# Patient Record
Sex: FEMALE | Race: WHITE | NOT HISPANIC OR LATINO | Employment: UNEMPLOYED | URBAN - METROPOLITAN AREA
[De-identification: names, ages, dates, MRNs, and addresses within clinical notes are randomized per-mention and may not be internally consistent; named-entity substitution may affect disease eponyms.]

---

## 2022-04-10 ENCOUNTER — HISTORICAL (OUTPATIENT)
Dept: ADMINISTRATIVE | Facility: HOSPITAL | Age: 27
End: 2022-04-10

## 2022-04-26 VITALS
SYSTOLIC BLOOD PRESSURE: 101 MMHG | DIASTOLIC BLOOD PRESSURE: 65 MMHG | HEIGHT: 63 IN | WEIGHT: 107.06 LBS | BODY MASS INDEX: 18.97 KG/M2

## 2023-01-13 DIAGNOSIS — Z01.419 ENCOUNTER FOR ANNUAL ROUTINE GYNECOLOGICAL EXAMINATION: ICD-10-CM

## 2023-01-13 PROBLEM — Z30.9 ENCOUNTER FOR CONTRACEPTIVE MANAGEMENT, UNSPECIFIED: Status: ACTIVE | Noted: 2023-01-13

## 2023-02-01 ENCOUNTER — DOCUMENTATION ONLY (OUTPATIENT)
Dept: OBSTETRICS AND GYNECOLOGY | Facility: CLINIC | Age: 28
End: 2023-02-01
Payer: COMMERCIAL

## 2023-02-01 LAB — PAP SMEAR: NORMAL

## 2023-02-02 ENCOUNTER — OFFICE VISIT (OUTPATIENT)
Dept: OBSTETRICS AND GYNECOLOGY | Facility: CLINIC | Age: 28
End: 2023-02-02
Payer: COMMERCIAL

## 2023-02-02 VITALS
HEART RATE: 72 BPM | SYSTOLIC BLOOD PRESSURE: 80 MMHG | WEIGHT: 111.75 LBS | HEIGHT: 63 IN | BODY MASS INDEX: 19.8 KG/M2 | DIASTOLIC BLOOD PRESSURE: 60 MMHG

## 2023-02-02 DIAGNOSIS — N92.0 MENORRHAGIA WITH REGULAR CYCLE: Primary | ICD-10-CM

## 2023-02-02 DIAGNOSIS — D25.9 UTERINE LEIOMYOMA, UNSPECIFIED LOCATION: ICD-10-CM

## 2023-02-02 PROCEDURE — 99213 PR OFFICE/OUTPT VISIT, EST, LEVL III, 20-29 MIN: ICD-10-PCS | Mod: ,,, | Performed by: OBSTETRICS & GYNECOLOGY

## 2023-02-02 PROCEDURE — 99213 OFFICE O/P EST LOW 20 MIN: CPT | Mod: ,,, | Performed by: OBSTETRICS & GYNECOLOGY

## 2023-02-02 RX ORDER — TRANEXAMIC ACID 650 MG/1
1300 TABLET ORAL 3 TIMES DAILY
Qty: 30 TABLET | Refills: 5 | Status: SHIPPED | OUTPATIENT
Start: 2023-02-02 | End: 2023-02-07

## 2023-02-03 NOTE — PROGRESS NOTES
"     Patient ID: [id number]     Chief Complaint: Results (PRESENTS TO CLINIC FOR RESULTS OF LABS AND U/S DONE. CONT. TO C/O IRREG BLEEDING AND CRAMPING.)      HPI:     Jany Ibarra is a 27 y.o. female here today for Results (PRESENTS TO CLINIC FOR RESULTS OF LABS AND U/S DONE. CONT. TO C/O IRREG BLEEDING AND CRAMPING.)  PELVIC U/S: 7CM UTERUS, RETROFLEXED, SUBCM FIBROID, SUBSEROSAL, ES: 1.4 MM, ADNEXAL NL.   TSH/FREE T4: NL  H/H:   ONE SWAB AND PAP NEG  This finding discussed with patient at length. She is trying to conceive. Discussed non hormonal options to control bleeding. Discussed Lysted and how to use it.     Past Medical History:  has no past medical history on file.    Surgical History:  has a past surgical history that includes  section; Forehead reconstruction (N/A); SKIN GRAPH GROIN AREA; and HALO VEST.    Family History: family history includes Bone cancer in her maternal grandmother; Colon cancer in her maternal grandmother.    Social History:  reports that she has never smoked. She has never used smokeless tobacco. She reports current alcohol use of about 1.0 - 2.0 standard drink per week. She reports that she does not use drugs.    Current Outpatient Medications   NONE                      Patient has No Known Allergies.     Patient Care Team:  GERARDO Malone as PCP - General (Family Medicine)       Subjective:     Review of Systems    12 point review of systems conducted, negative except as stated in the history of present illness. See HPI for details.      Objective:     Visit Vitals  BP (!) 80/60   Pulse 72   Ht 5' 3" (1.6 m)   Wt 50.7 kg (111 lb 12.4 oz)   LMP 2023 (Approximate)   BMI 19.80 kg/m²       Physical Exam  Constitutional:  General Appearance : alert, in no acute distress, normal, well nourished.  Neck/Thyroid:  Inspection/Palpation: normal. Thyroid: normal size and shape.  Respiratory:  Auscultation: clear to auscultation bilaterally. Respiratory Effort: " normal.  Gastrointestinal:  Abdomen: no masses. no tender, nondistended.  Liver and spleen: normal  Hernias: no hernias present, no inguinal adenopathy.  Genitourinary:  No pelvic exam today.    Chaperone Present      Assessment:       ICD-10-CM ICD-9-CM   1. Menorrhagia with regular cycle  N92.0 626.2   2. Uterine leiomyoma, unspecified location  D25.9 218.9        Plan     1. Menorrhagia with regular cycle  Begin Lysteda, if heavy bleeding persists, will do Hysteroscopy to evaluate endocvx and endometrium.     - tranexamic acid (LYSTEDA) 650 mg tablet; Take 2 tablets (1,300 mg total) by mouth 3 (three) times daily. for 5 days  Dispense: 30 tablet; Refill: 5    2. Uterine leiomyoma, unspecified location     Follow up in about 3 months (around 5/2/2023) for F/U LYSTEDA. In addition to their scheduled follow up, the patient has also been instructed to follow up on as needed basis.     Future Appointments   Date Time Provider Department Center   5/2/2023 10:00 AM Qamar Ramirez MD [facility] [place] RAMIREZ MD

## 2023-02-16 ENCOUNTER — DOCUMENTATION ONLY (OUTPATIENT)
Dept: OBSTETRICS AND GYNECOLOGY | Facility: CLINIC | Age: 28
End: 2023-02-16
Payer: COMMERCIAL

## 2023-05-02 ENCOUNTER — OFFICE VISIT (OUTPATIENT)
Dept: OBSTETRICS AND GYNECOLOGY | Facility: CLINIC | Age: 28
End: 2023-05-02
Payer: COMMERCIAL

## 2023-05-02 VITALS
SYSTOLIC BLOOD PRESSURE: 102 MMHG | HEIGHT: 63 IN | HEART RATE: 70 BPM | WEIGHT: 112 LBS | DIASTOLIC BLOOD PRESSURE: 66 MMHG | BODY MASS INDEX: 19.84 KG/M2

## 2023-05-02 DIAGNOSIS — N92.0 MENORRHAGIA WITH REGULAR CYCLE: Primary | ICD-10-CM

## 2023-05-02 PROCEDURE — 99213 OFFICE O/P EST LOW 20 MIN: CPT | Mod: ,,, | Performed by: OBSTETRICS & GYNECOLOGY

## 2023-05-02 PROCEDURE — 99213 PR OFFICE/OUTPT VISIT, EST, LEVL III, 20-29 MIN: ICD-10-PCS | Mod: ,,, | Performed by: OBSTETRICS & GYNECOLOGY

## 2023-05-02 NOTE — PROGRESS NOTES
" Patient ID: [id number]   Chief Complaint: 3 month follow up     HPI:     Jany Ibarra is a 27 y.o.  here today for 3 month follow up (Lysteda follow up. States took Lysteda x5 days denies any complications)  BLEEDING HAS BEEN NL SINCE THAT TIMES  DISCUSSED CLOMID AS THEY HAVE BEEN TRYING TO CONCEIVE OVER A YEAR  ALSO DISCUSSED SEMEN ANALYSIS AS WELL    Past Medical History:  has no past medical history on file.    Surgical History:  has a past surgical history that includes  section; Forehead reconstruction (N/A); SKIN GRAPH GROIN AREA; and HALO VEST.    Family History: family history includes Bone cancer in her maternal grandmother; Colon cancer in her maternal grandmother.    Social History:  reports that she has never smoked. She has never used smokeless tobacco. She reports current alcohol use of about 1.0 - 2.0 standard drink per week. She reports that she does not use drugs.    No current outpatient medications on file.     No current facility-administered medications for this visit.       Patient has No Known Allergies.     MENARCHEAL:  Cycle Length: 5 days   Flow: heavy  Dysmenorrhea: Yes  If yes: Moderaye  Intermenstrual Bleeding: No  PAP:  Last PAP: 2023    History of Abnormal PAP Smear: NO      INTERCOURSE:  Dyspareunia: No  Postcoital Bleeding: No  History of STI: No  Sexually Active: yes          No results found for this or any previous visit (from the past 24 hour(s)).    Subjective:     Review of Systems    12 point review of systems conducted, negative except as stated in the history of present illness. See HPI for details.      Objective:     Visit Vitals  /66   Pulse 70   Ht 5' 3" (1.6 m)   Wt 50.8 kg (112 lb)   LMP 2023   BMI 19.84 kg/m²       Physical Exam  Constitutional:  General Appearance : alert, in no acute distress, normal, well nourished.  Neck/Thyroid:  Inspection/Palpation: normal. Thyroid: normal size and shape.  Respiratory:  Respiratory Effort: " normal.  Gastrointestinal:  Abdomen: no masses. no tender, nondistended.  Liver and spleen: normal  Hernias: no hernias present, no inguinal adenopathy.  Genitourinary:  NO PELVIC EXAM  Chaperone Present  Assessment:       ICD-10-CM ICD-9-CM   1. Menorrhagia with regular cycle  N92.0 626.2     Plan   Menorrhagia with regular cycle  RESOLVED  CALL IF DECIDES ON CLOMID OR SEMEN ANALYSIS  Follow up if symptoms worsen or fail to improve. In addition to their scheduled follow up, the patient has also been instructed to follow up on as needed basis.     ZAKIYA ROMERO MD

## 2023-05-03 ENCOUNTER — TELEPHONE (OUTPATIENT)
Dept: OBSTETRICS AND GYNECOLOGY | Facility: CLINIC | Age: 28
End: 2023-05-03
Payer: COMMERCIAL

## 2023-05-03 NOTE — TELEPHONE ENCOUNTER
----- Message from Mi Michelle sent at 5/3/2023  8:22 AM CDT -----  NEEDS NURSE TO CALL HER BACK ABOUT AN RX THAT HER AND DR ROMERO TALKED ABOUT.

## 2023-05-03 NOTE — TELEPHONE ENCOUNTER
RETURNED PT. CALL  CONFIRMED. STATES DR. ROMERO AND HER HAD DISCUSSED  HAVING SEMEN ANALYSIS DONE AND SHE WOULD LIKE TO  ORDER TO HAVE IT DONE. DR. ROMERO NOTIFIED AND ORDER FOR PRAVIN KELLEY  [birth year] LEFT AT  FOR PT. TO .INSTRUCTED TO CALL OFFICE AFTER DONE FOR RESULTS.

## 2023-11-13 ENCOUNTER — TELEPHONE (OUTPATIENT)
Dept: OBSTETRICS AND GYNECOLOGY | Facility: CLINIC | Age: 28
End: 2023-11-13
Payer: COMMERCIAL

## 2023-11-13 DIAGNOSIS — O00.90 ECTOPIC PREGNANCY, UNSPECIFIED LOCATION, UNSPECIFIED WHETHER INTRAUTERINE PREGNANCY PRESENT: Primary | ICD-10-CM

## 2023-11-13 NOTE — TELEPHONE ENCOUNTER
CALLED PT  CONFIRMED. INFORMED PT. WILL NEED TO REPEAT QUANTITATIVE BETA HCG 23 IN AM BEFORE SCHEDULED APPOINT THURSDAY AFTERNOON. PT. WILL  ORDER THURSDAY MORNING AND HAVE LABS DRAWN AT [facility]. STATES FEELING BETTER EVERYDAY.

## 2023-11-16 ENCOUNTER — DOCUMENTATION ONLY (OUTPATIENT)
Dept: OBSTETRICS AND GYNECOLOGY | Facility: CLINIC | Age: 28
End: 2023-11-16

## 2023-11-16 ENCOUNTER — OFFICE VISIT (OUTPATIENT)
Dept: OBSTETRICS AND GYNECOLOGY | Facility: CLINIC | Age: 28
End: 2023-11-16
Payer: COMMERCIAL

## 2023-11-16 VITALS
SYSTOLIC BLOOD PRESSURE: 118 MMHG | BODY MASS INDEX: 19.8 KG/M2 | HEIGHT: 63 IN | WEIGHT: 111.75 LBS | DIASTOLIC BLOOD PRESSURE: 60 MMHG | HEART RATE: 96 BPM

## 2023-11-16 DIAGNOSIS — Z09 POSTOP CHECK: ICD-10-CM

## 2023-11-16 DIAGNOSIS — O00.90 ECTOPIC PREGNANCY, UNSPECIFIED LOCATION, UNSPECIFIED WHETHER INTRAUTERINE PREGNANCY PRESENT: Primary | ICD-10-CM

## 2023-11-16 PROCEDURE — 99213 OFFICE O/P EST LOW 20 MIN: CPT | Mod: ,,, | Performed by: OBSTETRICS & GYNECOLOGY

## 2023-11-16 NOTE — PROGRESS NOTES
"Subjective:      Jany Ibarra is a 28 y.o.  who presents to the clinic 6 DAYS status post diag laparoscopy by Dr. Nakia Feng for ectopic pregnancy.  The patient is not having any pain. Post-op Evaluation, no complaints today, normal bowel and bladder function, no fever. Discussed having HSG done prior to attempting pregnancy again, to insure patency of the other tube.    History reviewed. No pertinent past medical history.  Past Surgical History:   Procedure Laterality Date     SECTION      FOREHEAD RECONSTRUCTION N/A     HALO VEST      SKIN GRAPH GROIN AREA       Review of patient's allergies indicates:  No Known Allergies  OB History    Para Term  AB Living   2 1 1   1 1   SAB IAB Ectopic Multiple Live Births       1   1      # Outcome Date GA Lbr Justin/2nd Weight Sex Delivery Anes PTL Lv   2 Ectopic 11/10/23 5w0d       FD   1 Term 16     CS-LTranv   AMISHA     Social History     Tobacco Use    Smoking status: Never    Smokeless tobacco: Never   Substance Use Topics    Alcohol use: Yes     Alcohol/week: 1.0 - 2.0 standard drink of alcohol     Types: 1 - 2 Drinks containing 0.5 oz of alcohol per week    Drug use: Never     Family History   Problem Relation Age of Onset    Colon cancer Maternal Grandmother     Bone cancer Maternal Grandmother      No current outpatient medications on file.    A comprehensive review of symptoms was completed and negative except as noted above.  Objective:   /60   Pulse 96   Ht 5' 3" (1.6 m)   Wt 50.7 kg (111 lb 12.4 oz)   BMI 19.80 kg/m²   General Appearance: alert, in no acute distress, normal, well nourished.  Breast:  Right: Inspection/palpation: no discharge, no masses present, no nipple retraction, no skin changes, no skin dimpling, no tenderness, no lymphadenopathy, no axillary mass, no axillary tenderness.  Left: Inspection/palpation: no discharge, no masses present, no nipple retraction, no skin changes, no skin dimpling, no " tenderness, no lymphadenopathy, no axillary mass, no axillary tenderness.  Gastrointestinal:  Abdomen: no masses. no tender, nondistended.  Liver and spleen: normal  Hernias: no hernias present, no inguinal adenopathy.  Wound Site: clean, dry and intact without erythema or induration.  Incisions healing well.  Assessment:   Ectopic pregnancy, unspecified location, unspecified whether intrauterine pregnancy present  -     HCG, Quantitative; Future; Expected date: 11/23/2023    Postop check         No results found for this or any previous visit (from the past 24 hour(s)).    Plan:   Continue any current medications.  Wound care discussed.  Follow up Follow up in about 2 weeks (around 11/30/2023) for POST-OP. In addition to her scheduled follow up, the pt has also been instructed to follow up on as needed basis.

## 2024-02-06 ENCOUNTER — TELEPHONE (OUTPATIENT)
Dept: OBSTETRICS AND GYNECOLOGY | Facility: CLINIC | Age: 29
End: 2024-02-06
Payer: COMMERCIAL

## 2024-02-06 NOTE — TELEPHONE ENCOUNTER
RYAN Nelson np returned patients call --- Message from Mi Michelle sent at 2/6/2024  8:18 AM CST -----  Regarding: CALL BACK  Needs a nurse to call her back. She has some questions about clomid.

## 2024-02-22 ENCOUNTER — OFFICE VISIT (OUTPATIENT)
Dept: OBSTETRICS AND GYNECOLOGY | Facility: CLINIC | Age: 29
End: 2024-02-22
Payer: COMMERCIAL

## 2024-02-22 VITALS
BODY MASS INDEX: 19.97 KG/M2 | SYSTOLIC BLOOD PRESSURE: 112 MMHG | HEIGHT: 63 IN | DIASTOLIC BLOOD PRESSURE: 66 MMHG | WEIGHT: 112.69 LBS | OXYGEN SATURATION: 99 % | RESPIRATION RATE: 18 BRPM | HEART RATE: 74 BPM

## 2024-02-22 DIAGNOSIS — N97.9 INFERTILITY, FEMALE: Primary | ICD-10-CM

## 2024-02-22 PROCEDURE — 99213 OFFICE O/P EST LOW 20 MIN: CPT | Mod: ,,, | Performed by: OBSTETRICS & GYNECOLOGY

## 2024-02-22 RX ORDER — CLOMIPHENE CITRATE 50 MG/1
50 TABLET ORAL DAILY
Qty: 5 TABLET | Refills: 0 | Status: SHIPPED | OUTPATIENT
Start: 2024-02-22 | End: 2024-02-27

## 2024-02-22 NOTE — PROGRESS NOTES
Patient ID: [id number]   Chief Complaint: medications (Wants to discuss starting  clomid. )    HPI:   Jany Ibarra is a 28 y.o.  here today for medications (Wants to discuss starting  clomid. )  DISCUSSED HOW CLOMID WORKS, THE SIDE EFFECTS AND THE FOLLOW-UPS  SHE WOULD LIKE TO PROCEED.      Patient's last menstrual period was 2024 (exact date).  Past Medical History:  has no past medical history on file.  Surgical History:  has a past surgical history that includes  section; Forehead reconstruction (N/A); SKIN GRAPH GROIN AREA; HALO VEST; and Salpingectomy (Left, 11/10/2023).  Family History: family history includes Bone cancer in her maternal grandmother; Colon cancer in her maternal grandmother.  Social History:  reports that she has never smoked. She has never been exposed to tobacco smoke. She has never used smokeless tobacco. She reports current alcohol use of about 1.0 - 2.0 standard drink of alcohol per week. She reports that she does not use drugs.  Current Outpatient Medications   Medication Sig Dispense Refill    clomiPHENE (CLOMID) 50 mg tablet Take 1 tablet (50 mg total) by mouth once daily. for 5 days 5 tablet 0     No current facility-administered medications for this visit.     Patient has No Known Allergies.  MENARCHEAL:  Cycle Length: 5 days   Flow: heavy  Dysmenorrhea: Yes  If yes: Moderaye  Intermenstrual Bleeding: No  PAP:  Last PAP: 2023    History of Abnormal PAP Smear: NO  Treated: n/a  HPV Vaccine:not sure     INTERCOURSE:  Dyspareunia: No  Postcoital Bleeding: No  History of STI: No   If yes, then: No   Current Birth Control Method: none  Sexually Active: yes        No results found for this or any previous visit (from the past 24 hour(s)).    Subjective:   Review of Systems  12 point review of systems conducted, negative except as stated in the history of present illness. See HPI for details.  Objective:     Visit Vitals  /66 (BP Location: Left arm)   Pulse  "74   Resp 18   Ht 5' 3" (1.6 m)   Wt 51.1 kg (112 lb 11.2 oz)   LMP 01/26/2024 (Exact Date)   SpO2 99%   BMI 19.96 kg/m²     No results found for this or any previous visit (from the past 24 hour(s)).  Physical Exam  Constitutional:  General Appearance : alert, in no acute distress, normal, well nourished.  Neck/Thyroid:  Inspection/Palpation: normal. Thyroid: normal size and shape.  Respiratory:  Respiratory Effort: normal.  Gastrointestinal:  Abdomen: no masses. no tender, nondistended.  Liver and spleen: normal  Hernias: no hernias present, no inguinal adenopathy.  Genitourinary:  NO PELVIC EXAM  Anus and Perineum: visually normal.   Chaperone Present  Assessment:       ICD-10-CM ICD-9-CM   1. Infertility, female  N97.9 628.9     Plan   Infertility, female  -     clomiPHENE (CLOMID) 50 mg tablet; Take 1 tablet (50 mg total) by mouth once daily. for 5 days  Dispense: 5 tablet; Refill: 0    Follow up if symptoms worsen or fail to improve. In addition to their scheduled follow up, the patient has also been instructed to follow up on as needed basis.     ZAKIYA ROMERO MD    "

## 2024-03-25 ENCOUNTER — TELEPHONE (OUTPATIENT)
Dept: OBSTETRICS AND GYNECOLOGY | Facility: CLINIC | Age: 29
End: 2024-03-25
Payer: COMMERCIAL

## 2024-03-25 NOTE — TELEPHONE ENCOUNTER
RETURNED PT. CALL.  CONFIRMED. STATES DR. ROMERO SAID HER  COULD DO U/S AND HE WOULD CALL IN CLOMID. STATES WILL DO U/S WED. INSTRUCTED PT. DR. ROMERO OUT OF OFFICE TODAY. PT. WILL CALL BACK TOMORROW AND CHECK IF HE WILL SEND CLOMID TO HER PHARMACY.

## 2024-03-25 NOTE — TELEPHONE ENCOUNTER
----- Message from Mi Michelle sent at 3/25/2024  8:31 AM CDT -----  Regarding: CLOMID  Pt is on clomid and started her cycle  yesterday but said that Dr. Ramirez agreed to let her  do her ultrasounds since he works at the hospital so that she didn't have to come into the office for them so now she needs a refill on clomid.

## 2024-03-26 ENCOUNTER — TELEPHONE (OUTPATIENT)
Dept: OBSTETRICS AND GYNECOLOGY | Facility: CLINIC | Age: 29
End: 2024-03-26
Payer: COMMERCIAL

## 2024-03-26 DIAGNOSIS — N97.9 INFERTILITY, FEMALE: Primary | ICD-10-CM

## 2024-03-26 RX ORDER — CLOMIPHENE CITRATE 50 MG/1
50 TABLET ORAL DAILY
Qty: 5 TABLET | Refills: 0 | Status: SHIPPED | OUTPATIENT
Start: 2024-03-26 | End: 2024-03-31

## 2024-03-26 NOTE — TELEPHONE ENCOUNTER
PT. NOTIFIED DR. ROMERO THAT  COMPLETED ULTRASOUND WITH NO CYST OR FOLLICLES NOTED. CLOMID 50 MG #5 ON DAYS 5-9 OF CYCLE 0 REFILLS SENT TO PHARMACY AS ORDERED PER DR. ROMERO.

## 2024-04-24 ENCOUNTER — TELEPHONE (OUTPATIENT)
Dept: OBSTETRICS AND GYNECOLOGY | Facility: CLINIC | Age: 29
End: 2024-04-24
Payer: COMMERCIAL

## 2024-04-24 DIAGNOSIS — N97.9 INFERTILITY, FEMALE: Primary | ICD-10-CM

## 2024-04-24 RX ORDER — CLOMIPHENE CITRATE 50 MG/1
TABLET ORAL
Qty: 5 TABLET | Refills: 0 | Status: SHIPPED | OUTPATIENT
Start: 2024-04-24 | End: 2024-05-24 | Stop reason: SDUPTHER

## 2024-04-24 NOTE — TELEPHONE ENCOUNTER
Return patient call,  confirmed, spoke with  call in cliomid 100mg #5 on days 5-9 of cycle no refille verbalized understating----- Message from Raquel Burleson MA sent at 2024  8:40 AM CDT -----  Pt called and stated she did US and follicles were normal size & Dr. Ramirez stated to call for refill on meds.  Patient would like Clomid refilled.         [facility] [place]  Call back #  [phone]

## 2024-05-24 ENCOUNTER — TELEPHONE (OUTPATIENT)
Dept: OBSTETRICS AND GYNECOLOGY | Facility: CLINIC | Age: 29
End: 2024-05-24
Payer: COMMERCIAL

## 2024-05-24 DIAGNOSIS — N97.9 INFERTILITY, FEMALE: ICD-10-CM

## 2024-05-24 RX ORDER — CLOMIPHENE CITRATE 50 MG/1
TABLET ORAL
Qty: 10 TABLET | Refills: 0 | Status: SHIPPED | OUTPATIENT
Start: 2024-05-24

## 2024-05-24 NOTE — TELEPHONE ENCOUNTER
PATIENT HAD U/S PERFORMED AT [facility].  THERE ARE NO RESIDUALS FOLLICLES AS REPORTED BY HER , WHO SCANNED HER.  SHE IS ON DAY 4 OF CYCLE AND SHE WOULD LIKE TO DO CLOMID 100MG AGAIN.  WILL SEND TO HER PHARMACY.

## 2025-02-17 ENCOUNTER — TELEPHONE (OUTPATIENT)
Dept: OBSTETRICS AND GYNECOLOGY | Facility: CLINIC | Age: 30
End: 2025-02-17
Payer: COMMERCIAL

## 2025-02-17 NOTE — TELEPHONE ENCOUNTER
----- Message from Usha sent at 2/17/2025 10:59 AM CST -----  Regarding: call back  Pt called with some questions about what kind of blood panel Dr. Ramirez does with infertility and hormones.